# Patient Record
Sex: FEMALE | Race: BLACK OR AFRICAN AMERICAN | NOT HISPANIC OR LATINO | Employment: OTHER | URBAN - METROPOLITAN AREA
[De-identification: names, ages, dates, MRNs, and addresses within clinical notes are randomized per-mention and may not be internally consistent; named-entity substitution may affect disease eponyms.]

---

## 2017-05-03 ENCOUNTER — HOSPITAL ENCOUNTER (EMERGENCY)
Facility: OTHER | Age: 47
Discharge: HOME | End: 2017-05-03
Attending: EMERGENCY MEDICINE
Payer: MEDICAID

## 2017-05-03 VITALS
HEIGHT: 67 IN | HEART RATE: 88 BPM | BODY MASS INDEX: 30.92 KG/M2 | OXYGEN SATURATION: 99 % | TEMPERATURE: 98 F | DIASTOLIC BLOOD PRESSURE: 82 MMHG | RESPIRATION RATE: 16 BRPM | WEIGHT: 197 LBS | SYSTOLIC BLOOD PRESSURE: 171 MMHG
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
## 2017-05-03 DIAGNOSIS — S93.601A RIGHT FOOT SPRAIN, INITIAL ENCOUNTER: ICD-10-CM

## 2017-05-03 DIAGNOSIS — G89.29 CHRONIC RIGHT HIP PAIN: Primary | ICD-10-CM

## 2017-05-03 DIAGNOSIS — M25.551 CHRONIC RIGHT HIP PAIN: Primary | ICD-10-CM

## 2017-05-03 PROCEDURE — 25000003 PHARM REV CODE 250: Performed by: NURSE PRACTITIONER

## 2017-05-03 PROCEDURE — 99284 EMERGENCY DEPT VISIT MOD MDM: CPT

## 2017-05-03 RX ORDER — ACETAMINOPHEN 500 MG
1000 TABLET ORAL
Status: COMPLETED | OUTPATIENT
Start: 2017-05-03 | End: 2017-05-03

## 2017-05-03 RX ADMIN — ACETAMINOPHEN 1000 MG: 500 TABLET ORAL at 06:05

## 2017-05-03 NOTE — ED TRIAGE NOTES
Pt to ED with complaint of right hip pain.  [facility] and started having hip pain in December.  States she fell last night and having foot pain and lower leg pain.  Patient states she has been falling more feels like her hip just gives out on her.  scratches noted to top of foot.  Patient states she has not seen anyone about her hip pain states she was thinking may be arthirits.Patient is AAOX3 RR easy non labored no other complaints.

## 2017-05-03 NOTE — ED PROVIDER NOTES
"Encounter Date: 5/3/2017       History     Chief Complaint   Patient presents with    Hip Problem     Pt states yeaterday that her right hip gave out, and fell. now CO right lower leg pain.     Review of patient's allergies indicates:  No Known Allergies  HPI Comments: 46-year-old female with PMH of diabetes, CKD, and HTN presenting to the ED with complaints of right hip and right ankle pain status post fall.  Patient reports having chronic right hip for the last year that has been intermittent.  Patient reports falling approximately one year ago resulting in right hip pain.  Denies having chronic hip pain evaluated by PCP. Patient reports yesterday "I was going down stairs and my hip went out on me".  Reports falling and "I think I twisted my ankle".  Denies head trauma and LOC.  Denies low back pain.  Denies radiating sharp pain.  Denies paresthesia.  Denies fever, nausea, and vomiting.  Swelling and tenderness noted to right ankle.  Denies use of over-the-counter medications.    The history is provided by the patient.     Past Medical History:   Diagnosis Date    Diabetes mellitus     Hypertension      Past Surgical History:   Procedure Laterality Date     SECTION, LOW TRANSVERSE      EYE SURGERY       Family History   Problem Relation Age of Onset    Hyperlipidemia Mother     Diabetes Mother     Cancer Father      Social History   Substance Use Topics    Smoking status: Current Every Day Smoker     Packs/day: 1.00     Types: Cigarettes    Smokeless tobacco: None    Alcohol use No     Review of Systems  A complete review of systems was performed and was negative except as noted in the HPI.  Physical Exam   Initial Vitals   BP Pulse Resp Temp SpO2   17 1632 17 1632 17 1632 17 1632 17 1632   188/86 94 18 97.7 °F (36.5 °C) 99 %     Physical Exam    Nursing note and vitals reviewed.  Constitutional: She appears well-developed and well-nourished. No distress.   HENT: "   Head: Normocephalic and atraumatic.   Right Ear: External ear normal.   Left Ear: External ear normal.   Mouth/Throat: Oropharynx is clear and moist. No oropharyngeal exudate.   Eyes: EOM are normal. Pupils are equal, round, and reactive to light.   Neck: Normal range of motion. Neck supple.   Cardiovascular: Normal rate, regular rhythm and intact distal pulses.   Pulmonary/Chest: Breath sounds normal. No respiratory distress. She has no wheezes. She has no rhonchi.   Abdominal: Soft. Bowel sounds are normal. She exhibits no distension. There is no tenderness.   Musculoskeletal: Normal range of motion. She exhibits no edema or tenderness.        Lumbar back: She exhibits normal range of motion, no tenderness and no bony tenderness.   Tenderness palpated to right hip. ROM intact.  Neurovascular and sensory intact.  Patient weightbearing but with pain.  +2 pedal pulse palpated.  Tenderness and swelling noted to dorsal aspect of right foot.  Range of motion intact.  Neurovascular and sensory intact.  No erythema noted.  Ankle not hot to touch.   Lymphadenopathy:     She has no cervical adenopathy.   Neurological: She is alert and oriented to person, place, and time. She has normal strength.   Skin: Skin is warm and dry. No rash noted. No erythema.   Psychiatric: She has a normal mood and affect.         ED Course   Procedures  Labs Reviewed   POCT URINE PREGNANCY             Medical Decision Making:   Initial Assessment:   This was an emergent evaluation of a 86-year-old female that presents with chronic right hip pain and right ankle pain status post fall.  The patient appears to have an ankle sprain.  The patient's xrays show no signs of fracture, dislocation, or subluxation.  Left hip pain for approximately one year intermittently.  I do not suspect septic joint, or other acute abnormality.  Patient afebrile, nontoxic-appearing, and hemodynamically stable.  Patient weightbearing and ambulatory with minimal  difficulty.  The patient could have a ligamentous injury, but the ankle doesn't appear to be unstable.  The patient will be discharged home to follow up with their physician or the doctor provided.  I have recommended RICE therapy. They will be treated with supportive care.                   ED Course     Clinical Impression:   The primary encounter diagnosis was Chronic right hip pain. Diagnoses of Fall and Right foot sprain, initial encounter were also pertinent to this visit.          Wendy Domingo NP  05/03/17 1998

## 2017-05-03 NOTE — ED AVS SNAPSHOT
[facility]  [address]  [place] [address]               Barbara Najera   5/3/2017  4:48 PM   ED    Description:  Female : 1970   Department:  [facility]           Your Care was Coordinated By:     Provider Role From To    Renata Steel MD Attending Provider 17 0937 --    Wendy Domingo NP Nurse Practitioner 17 [id number] --      Reason for Visit     Hip Problem           Diagnoses this Visit        Comments    Chronic right hip pain    -  Primary     Fall         Right foot sprain, initial encounter           ED Disposition     None           To Do List           Follow-up Information     Follow up with [facility]. Schedule an appointment as soon as possible for a visit in 1 week.    Specialties:  Behavioral Health, Psychiatry    Why:  For possible MRI    Contact information:    [address] [address]  [phone]        [facility] On Call     [facility] Nurse Care Line -  Assistance  Unless otherwise directed by your provider, please contact [facility], our nurse care line that is available for  assistance.     Registered nurses in the [facility] provide: appointment scheduling, clinical advisement, health education, and other advisory services.  Call: [phone] (toll free)               Medications           Message regarding Medications     Verify the changes and/or additions to your medication regime listed below are the same as discussed with your clinician today.  If any of these changes or additions are incorrect, please notify your healthcare provider.        These medications were administered today        Dose Freq    acetaminophen tablet 1,000 mg 1,000 mg ED 1 Time    Sig: Take 2 tablets (1,000 mg total) by mouth ED 1 Time.    Class: Normal    Route: Oral           Verify that the below list of medications is an accurate representation of the medications you are  "currently taking.  If none reported, the list may be blank. If incorrect, please contact your healthcare provider. Carry this list with you in case of emergency.           Current Medications     acetaminophen tablet 1,000 mg Take 2 tablets (1,000 mg total) by mouth ED 1 Time.    amlodipine (NORVASC) 5 MG tablet Take 1 tablet (5 mg total) by mouth once daily.    blood sugar diagnostic Strp 1 strip by Misc.(Non-Drug; Combo Route) route before meals as needed.    calcitRIOL (ROCALTROL) 0.25 MCG Cap Take 1 capsule (0.25 mcg total) by mouth every other day.    ergocalciferol (ERGOCALCIFEROL) 50,000 unit Cap Take 1 capsule (50,000 Units total) by mouth every 7 days.    ferrous sulfate 325 (65 FE) MG EC tablet Take 1 tablet (325 mg total) by mouth 2 (two) times daily.    hydrochlorothiazide (HYDRODIURIL) 25 MG tablet Take 1 tablet (25 mg total) by mouth once daily.    insulin aspart protamine-insulin aspart (NOVOLOG 70/30) 100 unit/mL (70-30) InPn pen 15 units SC each morning and 10 units SC each evening    lancets (ONETOUCH ULTRASOFT LANCETS) Misc 1 lancet by Misc.(Non-Drug; Combo Route) route 4 (four) times daily.    pen needle, diabetic (BD ULTRA-FINE DANII PEN NEEDLES) 32 gauge x 5/32" Ndle Inject 1 application into the skin 4 (four) times daily.    sodium bicarbonate 650 MG tablet Take 1 tablet (650 mg total) by mouth 2 (two) times daily.           Clinical Reference Information           Your Vitals Were     BP Pulse Temp Resp Height Weight    188/86 (BP Location: Left arm, Patient Position: Sitting) 94 97.7 °F (36.5 °C) (Oral) 18 5' 7" (1.702 m) 89.4 kg (197 lb)    SpO2 BMI             99% 30.85 kg/m2         Allergies as of 5/3/2017     No Known Allergies      Immunizations Administered on Date of Encounter - 5/3/2017     None      ED Micro, Lab, POCT     Start Ordered       Status Ordering Provider    05/03/17 1712 05/03/17 1712    Once,   Status:  Canceled      Canceled       ED Imaging Orders     Start Ordered "       Status Ordering Provider    05/03/17 1700 05/03/17 1700  X-Ray Hip 2 View Right  1 time imaging      Final result     05/03/17 1700 05/03/17 1700  X-Ray Ankle Complete Right  1 time imaging      Final result     05/03/17 1700 05/03/17 1700  X-Ray Foot Complete Right  1 time imaging      Edited Result - FINAL       Discharge References/Attachments     STRAINS AND SPRAINS, TREATING (ENGLISH)      [facility] Sign-Up     Activating your [facility] account is as easy as 1-2-3!     1) Visit [email or web address], select Sign Up Now, enter this activation code and your date of birth, then select Next.  [email or web address]  Expires: 6/17/2017  5:45 PM      2) Create a username and password to use when you visit [facility] in the future and select a security question in case you lose your password and select Next.    3) Enter your e-mail address and click Sign Up!    Additional Information  If you have questions, please e-mail [email or web address] or call [phone] to talk to our [facility] staff. Remember, [facility] is NOT to be used for urgent needs. For medical emergencies, dial 911.         Smoking Cessation     If you would like to quit smoking:   You may be eligible for free services if you are a [place] resident and started smoking cigarettes before September 1, 1988.  Call the Smoking Cessation Trust ([facility]) toll free at [phone] or [phone].   Call 9-315-QUIT-NOW if you do not meet the above criteria.   Contact us via email: [email or web address]   View our website for more information: [email or web address]         [facility] complies with applicable Federal civil rights laws and does not discriminate on the basis of race, color, national origin, age, disability, or sex.        Language Assistance Services     ATTENTION: Language assistance services are available, free of charge. Please call [phone].      ATENCIÓN: Si habla español, tiene a nunez disposición servicios  gratuitos de asistencia lingüística. Idalia tirado [phone].     BROOKS Ý: N?u b?n nói Ti?ng Vi?t, có các d?ch v? h? tr? ngôn ng? mi?n phí mahadh cho b?n. G?i s? [phone].

## 2017-05-31 ENCOUNTER — HOSPITAL ENCOUNTER (EMERGENCY)
Facility: HOSPITAL | Age: 47
Discharge: HOME | End: 2017-06-01
Attending: EMERGENCY MEDICINE
Payer: MEDICAID

## 2017-05-31 DIAGNOSIS — S02.5XXA CLOSED FRACTURE OF TOOTH, INITIAL ENCOUNTER: Primary | ICD-10-CM
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
## 2017-05-31 PROCEDURE — 99284 EMERGENCY DEPT VISIT MOD MDM: CPT

## 2017-05-31 RX ORDER — HYDROCODONE BITARTRATE AND ACETAMINOPHEN 5; 325 MG/1; MG/1
1 TABLET ORAL
Status: COMPLETED | OUTPATIENT
Start: 2017-06-01 | End: 2017-06-01

## 2017-06-01 VITALS
TEMPERATURE: 99 F | BODY MASS INDEX: 28.41 KG/M2 | RESPIRATION RATE: 20 BRPM | SYSTOLIC BLOOD PRESSURE: 190 MMHG | WEIGHT: 181 LBS | HEIGHT: 67 IN | DIASTOLIC BLOOD PRESSURE: 86 MMHG | OXYGEN SATURATION: 100 % | HEART RATE: 94 BPM

## 2017-06-01 PROCEDURE — 90471 IMMUNIZATION ADMIN: CPT | Performed by: EMERGENCY MEDICINE

## 2017-06-01 PROCEDURE — 90715 TDAP VACCINE 7 YRS/> IM: CPT | Performed by: EMERGENCY MEDICINE

## 2017-06-01 PROCEDURE — 63600175 PHARM REV CODE 636 W HCPCS: Performed by: EMERGENCY MEDICINE

## 2017-06-01 PROCEDURE — 25000003 PHARM REV CODE 250: Performed by: EMERGENCY MEDICINE

## 2017-06-01 RX ORDER — AMOXICILLIN AND CLAVULANATE POTASSIUM 875; 125 MG/1; MG/1
1 TABLET, FILM COATED ORAL 2 TIMES DAILY
Qty: 14 TABLET | Refills: 0 | Status: SHIPPED | OUTPATIENT
Start: 2017-06-01 | End: 2017-10-25

## 2017-06-01 RX ORDER — HYDROCODONE BITARTRATE AND ACETAMINOPHEN 5; 325 MG/1; MG/1
1 TABLET ORAL EVERY 12 HOURS PRN
Qty: 12 TABLET | Refills: 0 | Status: SHIPPED | OUTPATIENT
Start: 2017-06-01

## 2017-06-01 RX ADMIN — CLOSTRIDIUM TETANI TOXOID ANTIGEN (FORMALDEHYDE INACTIVATED), CORYNEBACTERIUM DIPHTHERIAE TOXOID ANTIGEN (FORMALDEHYDE INACTIVATED), BORDETELLA PERTUSSIS TOXOID ANTIGEN (GLUTARALDEHYDE INACTIVATED), BORDETELLA PERTUSSIS FILAMENTOUS HEMAGGLUTININ ANTIGEN (FORMALDEHYDE INACTIVATED), BORDETELLA PERTUSSIS PERTACTIN ANTIGEN, AND BORDETELLA PERTUSSIS FIMBRIAE 2/3 ANTIGEN 0.5 ML: 5; 2; 2.5; 5; 3; 5 INJECTION, SUSPENSION INTRAMUSCULAR at 12:06

## 2017-06-01 RX ADMIN — HYDROCODONE BITARTRATE AND ACETAMINOPHEN 1 TABLET: 5; 325 TABLET ORAL at 12:06

## 2017-06-01 NOTE — ED NOTES
Pt reports she slipped and fell when her hip left hip gave out while walking to come inside the hospital to visit her daughter. Pt reports ain to carlos knees ,hips and right side of face. Right front tooth broken at gum line

## 2017-06-01 NOTE — ED NOTES
Pt updated on status. Care plan discussed with pt. Pt verbalized understanding of information given.  NAD.  Will continue to monitor.

## 2017-06-01 NOTE — ED NOTES
Patient identifiers verified and correct for Barbara Najera.    LOC: The patient is awake, alert and aware of environment with an appropriate affect, the patient is oriented x 3 and speaking appropriately.  MUSCULOSKELETAL: Patient moving all extremities spontaneously, no obvious swelling or deformities noted.  RESPIRATORY: Airway is open and patent, respirations are spontaneous, patient has a normal effort and rate, no accessory muscle use noted, bilateral breath sounds CTA.  CARDIAC: Patient has a normal rate and regular rhythm, no periphreal edema noted, capillary refill < 3 seconds.  ABDOMEN: Soft and non tender to palpation, no distention noted, normoactive bowel sounds present in all four quadrants.  NEUROLOGIC: PERRL, 4 mm bilaterally, eyes open spontaneously, behavior appropriate to situation, follows commands, facial expression symmetrical, bilateral hand grasp equal and even, purposeful motor response noted, normal sensation in all extremities when touched with a finger.

## 2017-06-14 NOTE — ED PROVIDER NOTES
Encounter Date: 2017       History     Chief Complaint   Patient presents with    Fall     pt. was walking up sidewalk to visit daughter in ed when her rt. hip became weak and gave out on her causing her to fell on cement. no loc. pt. has a broken tooth, facial abrasions (R), abrasion to rt. knee pt. has weakness in her rt. hip from a previous fall a couple of months ago     Review of patient's allergies indicates:  No Known Allergies  Patient came to visit her daughter at the hospital and fell on the sidewalk on the side of the hospital.  She denies slipping or tripping, reports that she has intermittent R leg weakness and her leg gave out causing her to fall onto her face.  Patient denies LOC.  Reports it was difficult to bear weight following the incident on her R leg.  She has been seen by PCP for leg weakness but has not had advanced imaging such as MRI.        The history is provided by the patient.   Fall   The accident occurred just prior to arrival. The fall occurred while walking. Distance fallen: From standing.  She landed on concrete. The volume of blood lost was minimal. The point of impact was the face and right hip. The pain is present in the face and right hip. The pain is at a severity of 7/10. She was ambulatory at the scene. There was no entrapment after the fall. There was no drug use involved in the accident. There was no alcohol use involved in the accident. Pertinent negatives include no back pain, no fever and no nausea.     Past Medical History:   Diagnosis Date    Diabetes mellitus     Hypertension      Past Surgical History:   Procedure Laterality Date     SECTION, LOW TRANSVERSE      EYE SURGERY       Family History   Problem Relation Age of Onset    Hyperlipidemia Mother     Diabetes Mother     Cancer Father      Social History   Substance Use Topics    Smoking status: Current Every Day Smoker     Packs/day: 1.00     Types: Cigarettes    Smokeless tobacco: Not on  file    Alcohol use No     Review of Systems   Constitutional: Negative for fever.   HENT: Positive for dental problem and facial swelling. Negative for sore throat.    Respiratory: Negative for shortness of breath.    Cardiovascular: Negative for chest pain.   Gastrointestinal: Negative for nausea.   Genitourinary: Negative for dysuria.   Musculoskeletal: Negative for back pain.   Skin: Positive for wound. Negative for rash.   Neurological: Positive for weakness.   Hematological: Does not bruise/bleed easily.   All other systems reviewed and are negative.      Physical Exam     Initial Vitals [05/31/17 2325]   BP Pulse Resp Temp SpO2   (!) 156/110 106 20 98.7 °F (37.1 °C) 97 %     Physical Exam    Nursing note and vitals reviewed.  Constitutional: She appears well-developed and well-nourished.   HENT:   Head: Normocephalic.   Facial trauma with fratured front teeth, gingival disease and extensive dental decay noted. R facial abrasions and contusion with small superficial laceration noted   Eyes: Conjunctivae and EOM are normal. Pupils are equal, round, and reactive to light. Right eye exhibits no discharge. Left eye exhibits no discharge. No scleral icterus.   Neck: Normal range of motion. Neck supple.   Cardiovascular: Normal rate and normal heart sounds. Exam reveals no gallop and no friction rub.    No murmur heard.  Tachycardic 100-110   Pulmonary/Chest: Breath sounds normal. No stridor. No respiratory distress. She has no wheezes. She has no rhonchi. She has no rales.   Abdominal: Soft. Bowel sounds are normal. She exhibits no distension and no mass. There is no tenderness. There is no rebound and no guarding.   Musculoskeletal: Normal range of motion.   Neurological: She is alert and oriented to person, place, and time. She has normal strength. No cranial nerve deficit or sensory deficit.   Skin: Skin is warm and dry. Capillary refill takes less than 2 seconds.   Psychiatric: She has a normal mood and  affect. Her behavior is normal. Judgment and thought content normal.         ED Course   Procedures  Labs Reviewed - No data to display     Imaging Results          X-Ray Hips Bilateral 2 View Incl AP Pelvis (Final result)  Result time 06/01/17 02:42:10    Final result by Carson Perera MD (06/01/17 02:42:10)                 Impression:        No acute displaced fracture or dislocation identified.      Electronically signed by: CARSON PERERA MD, MD  Date:     06/01/17  Time:    02:42              Narrative:    COMPARISON: Right hip series 5/3/17    FINDINGS: 5 views bilateral hip series.        Bones are well mineralized. Overall alignment is within normal limits.  No acute displaced fracture, dislocation, or destructive osseous process identified.  Mild degenerative change of the pubic symphysis and bilateral SI and femoral acetabular joints.  Pelvic phleboliths and vascular calcifications noted. No subcutaneous emphysema or radiodense retained foreign body.                             CT Maxillofacial Without Contrast (Final result)  Result time 06/01/17 01:57:54    Final result by Carson Perera MD (06/01/17 01:57:54)                 Impression:        1. Upper and lower lip soft tissue swelling/contusion, without maxillofacial acute displaced fracture or dislocation identified.    2. Multifocal dental caries and periodontal disease.      Electronically signed by: CARSON PERERA MD, MD  Date:     06/01/17  Time:    01:57              Narrative:    HISTORY: Trauma    COMPARISON: Maxillofacial CT 7/28/10    FINDINGS: There is prominence of the upper and also lower lips, right more so than the left, suggesting soft tissue swelling from contusion in the setting of trauma.    The orbits and orbital contents appear intact.  The globes are symmetric and the lenses are anteriorly located.    Bilateral orbital rims, zygomatic arches, pterygoid plates, and mandibular condyles, TMJs and nasal bones appear symmetric and  intact. Minimal degenerative change of the included upper cervical spine. Bony nasal septum is relatively midline and intact.    There are multiple dental caries with a few missing teeth, as well as periapical lucency associated with right mandibular posterior most pre-molar, anterior most molar and left mandibular anterior most pre-molar teeth. No subcutaneous emphysema or radiodense retained foreign body.    The paranasal sinuses and imaged portions of the bilateral mastoid air cells are clear. Included airway is midline and patent. Bilateral submandibular glands and imaged portions of the bilateral parotid glands are within normal limits.                                 Medical Decision Making:   Initial Assessment:   Patient with leg weakness ongoing and fall.  Dental and facial trauma noted.  Discussed need for further eval of back and weakness, r/o head or facial injury with CT.  Dental fracture with avulsed partial avulsion of teeth noted and patient has a dentist she can followup with.                    ED Course     Clinical Impression:   The primary encounter diagnosis was Closed fracture of tooth, initial encounter. A diagnosis of Fall, accidental was also pertinent to this visit.    Disposition:   Disposition: Discharged  Condition: Stable       Victorina Mendoza MD  06/18/17 9582

## 2017-08-21 ENCOUNTER — TELEPHONE (OUTPATIENT)
Dept: PODIATRY | Facility: CLINIC | Age: 47
End: 2017-08-21

## 2017-10-06 ENCOUNTER — HOSPITAL ENCOUNTER (EMERGENCY)
Facility: OTHER | Age: 47
Discharge: HOME | End: 2017-10-07
Attending: EMERGENCY MEDICINE
Payer: MEDICAID

## 2017-10-06 DIAGNOSIS — K59.00 CONSTIPATION: Primary | ICD-10-CM

## 2017-10-06 LAB
B-HCG UR QL: NEGATIVE
CTP QC/QA: YES

## 2017-10-06 PROCEDURE — 96360 HYDRATION IV INFUSION INIT: CPT

## 2017-10-06 PROCEDURE — 99284 EMERGENCY DEPT VISIT MOD MDM: CPT | Mod: 25

## 2017-10-06 PROCEDURE — 25000003 PHARM REV CODE 250: Performed by: PHYSICIAN ASSISTANT

## 2017-10-06 PROCEDURE — 96361 HYDRATE IV INFUSION ADD-ON: CPT

## 2017-10-06 PROCEDURE — 81025 URINE PREGNANCY TEST: CPT | Performed by: PHYSICIAN ASSISTANT

## 2017-10-06 RX ORDER — PSEUDOEPHEDRINE/ACETAMINOPHEN 30MG-500MG
100 TABLET ORAL
Status: COMPLETED | OUTPATIENT
Start: 2017-10-06 | End: 2017-10-06

## 2017-10-06 RX ORDER — DOCUSATE SODIUM 100 MG/1
100 CAPSULE, LIQUID FILLED ORAL 2 TIMES DAILY
Qty: 60 CAPSULE | Refills: 0 | Status: SHIPPED | OUTPATIENT
Start: 2017-10-06

## 2017-10-06 RX ORDER — SYRING-NEEDL,DISP,INSUL,0.3 ML 29 G X1/2"
300 SYRINGE, EMPTY DISPOSABLE MISCELLANEOUS
Status: COMPLETED | OUTPATIENT
Start: 2017-10-06 | End: 2017-10-06

## 2017-10-06 RX ADMIN — Medication 100 ML: at 08:10

## 2017-10-06 RX ADMIN — SODIUM CHLORIDE 500 ML: 0.9 INJECTION, SOLUTION INTRAVENOUS at 10:10

## 2017-10-06 RX ADMIN — MAGESIUM CITRATE 300 ML: 1.75 LIQUID ORAL at 10:10

## 2017-10-07 VITALS
RESPIRATION RATE: 16 BRPM | TEMPERATURE: 98 F | HEART RATE: 92 BPM | OXYGEN SATURATION: 98 % | HEIGHT: 66 IN | BODY MASS INDEX: 26.52 KG/M2 | SYSTOLIC BLOOD PRESSURE: 148 MMHG | WEIGHT: 165 LBS | DIASTOLIC BLOOD PRESSURE: 75 MMHG

## 2017-10-07 NOTE — ED TRIAGE NOTES
Pt presents to the ED with c/o rectal pain starting last night. Pt states she was unable to pass stool for 4 days so drank some mag citrate with no relief. Pt states that when she gets the urge to push she feels like something is stopping it. Denies the presence of abdominal pain or N/V. Denies any other symptoms.

## 2017-10-07 NOTE — ED PROVIDER NOTES
Encounter Date: 10/6/2017       History     Chief Complaint   Patient presents with    Rectal Pain     pt reports has been having rectal pain 8/10 reports had been constipated and took some mag citrate last night with only little BM     Constipation     Patient is a 46-year-old female with a past medical history of hypertension and diabetes, presenting to the emergency department with complaints of constipation.  The patient reports that for the past 2 weeks she's had problems with intermittent constipation.  She reports that she's been trying over the counter medication including mag citrate and suppositories. She reports she was only able to pass a small amount of stool. She states she is concerned because when she tries to go, she has pain in her rectum. She denies previous episode.       The history is provided by the patient.     Review of patient's allergies indicates:  No Known Allergies  Past Medical History:   Diagnosis Date    Diabetes mellitus     Hypertension      Past Surgical History:   Procedure Laterality Date     SECTION, LOW TRANSVERSE      EYE SURGERY       Family History   Problem Relation Age of Onset    Hyperlipidemia Mother     Diabetes Mother     Cancer Father      Social History   Substance Use Topics    Smoking status: Current Every Day Smoker     Packs/day: 1.00     Types: Cigarettes    Smokeless tobacco: Never Used    Alcohol use No     Review of Systems   Constitutional: Negative for activity change, appetite change, chills, fatigue and fever.   HENT: Negative for congestion, rhinorrhea and sore throat.    Eyes: Negative for photophobia and visual disturbance.   Respiratory: Negative for cough, shortness of breath and wheezing.    Cardiovascular: Negative for chest pain.   Gastrointestinal: Positive for constipation and rectal pain. Negative for abdominal pain, diarrhea, nausea and vomiting.   Genitourinary: Negative for dysuria, hematuria and urgency.    Musculoskeletal: Negative for back pain, myalgias and neck pain.   Skin: Negative for color change and wound.   Neurological: Negative for weakness and headaches.   Psychiatric/Behavioral: Negative for agitation and confusion.       Physical Exam     Initial Vitals [10/06/17 1950]   BP Pulse Resp Temp SpO2   (!) 160/83 101 16 97.7 °F (36.5 °C) 98 %      MAP       108.67         Physical Exam    Nursing note and vitals reviewed.  Constitutional: She appears well-developed and well-nourished. She is not diaphoretic. She is cooperative.  Non-toxic appearance. She does not have a sickly appearance. She does not appear ill. No distress.   Well appearing,  female accompanied by her daughter in the ED. She is speaking in clear and full sentences, moving all extremities. She is in no acute distress.    HENT:   Head: Normocephalic and atraumatic.   Right Ear: External ear normal.   Left Ear: External ear normal.   Nose: Nose normal.   Mouth/Throat: Oropharynx is clear and moist.   Eyes: Conjunctivae and EOM are normal.   Neck: Normal range of motion. Neck supple.   Cardiovascular: Normal rate, regular rhythm and normal heart sounds.   Pulmonary/Chest: Breath sounds normal. No respiratory distress. She has no wheezes.   Abdominal: Soft. Bowel sounds are normal. She exhibits no distension. There is no tenderness. There is no rebound and no guarding.   Genitourinary:   Genitourinary Comments: Significant, cari colored stool surrounding the anus and bilateral buttocks.    Musculoskeletal: Normal range of motion.   Neurological: She is alert and oriented to person, place, and time. GCS eye subscore is 4. GCS verbal subscore is 5. GCS motor subscore is 6.   Skin: Skin is warm.   Psychiatric: She has a normal mood and affect. Her behavior is normal. Judgment and thought content normal.         ED Course   Procedures  Labs Reviewed   POCT URINE PREGNANCY        Imaging Results          X-Ray Abdomen Flat And Erect  (Final result)  Result time 10/06/17 23:14:30    Final result by Rigo Lawler MD (10/06/17 23:14:30)                 Impression:       Nonobstructive bowel gas pattern.    Large amount of stool within the sigmoid colon.          Electronically signed by: RIGO LAWLER MD  Date:     10/06/17  Time:    23:14              Narrative:    Exam: [id number]  10/06/17  22:22:44 [id number] ([facility]) : XR ABDOMEN FLAT AND ERECT    Technique:    Frontal and upright radiographs of the abdomen.    Comparison:    11/26/2016    Findings:      The lung bases are within normal limits.  There is no evidence of free air beneath the hemidiaphragms.    No differential air-fluid levels are noted.  There is no evidence of bowel distention.  There is large amount of stool within the sigmoid colon. There is no evidence of pneumatosis.  No portal venous air is identified.  No abnormal calcifications are present.    The osseous structures are unremarkable.                             X-Rays:   Independently Interpreted Readings:   Other Readings:  X-ray flat and erect - large stool in the sigmoid colon, no free air    Medical Decision Making:   Initial Assessment:   Urgent evaluation of a 46 y.o. female with a past medical history of HTN, DM, presenting to the emergency department complaining of constipation and rectal pain. Patient is afebrile, nontoxic appearing and hemodynamically stable. Physical exam reveals regular rate and rhythm, lungs are clear to auscultation bilaterally, abdomen is soft and nontender. Active stool at the anus.     Independently Interpreted Test(s):   I have ordered and independently interpreted X-rays - see prior notes.  Clinical Tests:   Lab Tests: Ordered and Reviewed  The following lab test(s) were unremarkable: UPT  Radiological Study: Ordered and Reviewed  ED Management:  Will plan for brown bomb and reassess. X-ray shows large amount of stool within the sigmoid colon.   Patient was given brown bomb enema after which she  had a large bowel movement. She reports much relief. At this time, no further testing or imaging is warranted. Patient is given a prescription for colace and counseled on symptomatic care and treatment. Stable for discharge home. The patient was instructed to follow up with a primary care provider in 2 days or to return to the emergency department for worsening symptoms. The treatment plan was discussed with the patient who demonstrated understanding and comfort with plan. The patient's history, physical exam, and plan of care was discussed with and agreed upon with my supervising physician.    Other:   I have discussed this case with another health care provider.       <> Summary of the Discussion: Dr. Motley  This note was created using Dragon Medical Dictation. There may be typographical errors secondary to dictation.                    ED Course      Clinical Impression:     1. Constipation         Disposition:   Disposition: Discharged  Condition: Stable                        Ani Barahona PA-C  10/06/17 [id number]

## 2017-10-23 ENCOUNTER — HOSPITAL ENCOUNTER (EMERGENCY)
Facility: OTHER | Age: 47
Discharge: HOME | End: 2017-10-23
Attending: EMERGENCY MEDICINE
Payer: MEDICAID

## 2017-10-23 VITALS
SYSTOLIC BLOOD PRESSURE: 219 MMHG | HEIGHT: 67 IN | OXYGEN SATURATION: 100 % | TEMPERATURE: 98 F | BODY MASS INDEX: 25.11 KG/M2 | WEIGHT: 160 LBS | DIASTOLIC BLOOD PRESSURE: 103 MMHG | HEART RATE: 96 BPM | RESPIRATION RATE: 16 BRPM

## 2017-10-23 DIAGNOSIS — M51.36 DEGENERATIVE DISC DISEASE, LUMBAR: ICD-10-CM

## 2017-10-23 DIAGNOSIS — R29.898 UPPER EXTREMITY WEAKNESS: ICD-10-CM

## 2017-10-23 DIAGNOSIS — M54.16 LUMBAR RADICULOPATHY: ICD-10-CM

## 2017-10-23 DIAGNOSIS — D64.9 ANEMIA, UNSPECIFIED TYPE: ICD-10-CM

## 2017-10-23 DIAGNOSIS — N18.9 CHRONIC RENAL INSUFFICIENCY, UNSPECIFIED STAGE: ICD-10-CM

## 2017-10-23 DIAGNOSIS — R20.2 PARESTHESIA OF BOTH LEGS: ICD-10-CM

## 2017-10-23 DIAGNOSIS — R29.898 WEAKNESS OF BOTH LOWER EXTREMITIES: Primary | ICD-10-CM

## 2017-10-23 LAB
ALBUMIN SERPL BCP-MCNC: 3.1 G/DL
ALP SERPL-CCNC: 82 U/L
ALT SERPL W/O P-5'-P-CCNC: 13 U/L
ANION GAP SERPL CALC-SCNC: 7 MMOL/L
AST SERPL-CCNC: 12 U/L
B-HCG UR QL: NEGATIVE
BASOPHILS # BLD AUTO: 0.04 K/UL
BASOPHILS NFR BLD: 0.8 %
BILIRUB SERPL-MCNC: 0.2 MG/DL
BUN SERPL-MCNC: 44 MG/DL
CALCIUM SERPL-MCNC: 8.9 MG/DL
CHLORIDE SERPL-SCNC: 117 MMOL/L
CO2 SERPL-SCNC: 17 MMOL/L
CREAT SERPL-MCNC: 1.8 MG/DL
CTP QC/QA: YES
DIFFERENTIAL METHOD: ABNORMAL
EOSINOPHIL # BLD AUTO: 0.2 K/UL
EOSINOPHIL NFR BLD: 4.4 %
ERYTHROCYTE [DISTWIDTH] IN BLOOD BY AUTOMATED COUNT: 13.1 %
EST. GFR  (AFRICAN AMERICAN): 39 ML/MIN/1.73 M^2
EST. GFR  (NON AFRICAN AMERICAN): 34 ML/MIN/1.73 M^2
GLUCOSE SERPL-MCNC: 101 MG/DL
HCT VFR BLD AUTO: 32.7 %
HGB BLD-MCNC: 10.7 G/DL
LYMPHOCYTES # BLD AUTO: 1.3 K/UL
LYMPHOCYTES NFR BLD: 25.1 %
MCH RBC QN AUTO: 28.2 PG
MCHC RBC AUTO-ENTMCNC: 32.7 G/DL
MCV RBC AUTO: 86 FL
MONOCYTES # BLD AUTO: 0.5 K/UL
MONOCYTES NFR BLD: 9.7 %
NEUTROPHILS # BLD AUTO: 3 K/UL
NEUTROPHILS NFR BLD: 59.8 %
PLATELET # BLD AUTO: 333 K/UL
PMV BLD AUTO: 10.8 FL
POCT GLUCOSE: 76 MG/DL (ref 70–110)
POTASSIUM SERPL-SCNC: 3.8 MMOL/L
PROT SERPL-MCNC: 7 G/DL
RBC # BLD AUTO: 3.8 M/UL
SODIUM SERPL-SCNC: 141 MMOL/L
WBC # BLD AUTO: 5.05 K/UL

## 2017-10-23 PROCEDURE — 99284 EMERGENCY DEPT VISIT MOD MDM: CPT | Mod: 25

## 2017-10-23 PROCEDURE — 82962 GLUCOSE BLOOD TEST: CPT

## 2017-10-23 PROCEDURE — 85025 COMPLETE CBC W/AUTO DIFF WBC: CPT

## 2017-10-23 PROCEDURE — 80053 COMPREHEN METABOLIC PANEL: CPT

## 2017-10-23 PROCEDURE — 25500020 PHARM REV CODE 255: Performed by: EMERGENCY MEDICINE

## 2017-10-23 PROCEDURE — 81025 URINE PREGNANCY TEST: CPT | Performed by: EMERGENCY MEDICINE

## 2017-10-23 PROCEDURE — 96374 THER/PROPH/DIAG INJ IV PUSH: CPT

## 2017-10-23 PROCEDURE — A9585 GADOBUTROL INJECTION: HCPCS | Performed by: EMERGENCY MEDICINE

## 2017-10-23 PROCEDURE — 25000003 PHARM REV CODE 250: Performed by: EMERGENCY MEDICINE

## 2017-10-23 RX ORDER — GADOBUTROL 604.72 MG/ML
7 INJECTION INTRAVENOUS
Status: COMPLETED | OUTPATIENT
Start: 2017-10-23 | End: 2017-10-23

## 2017-10-23 RX ORDER — AMLODIPINE BESYLATE 5 MG/1
5 TABLET ORAL
Status: COMPLETED | OUTPATIENT
Start: 2017-10-23 | End: 2017-10-23

## 2017-10-23 RX ORDER — CHLORTHALIDONE 25 MG/1
25 TABLET ORAL DAILY
COMMUNITY

## 2017-10-23 RX ADMIN — AMLODIPINE BESYLATE 5 MG: 5 TABLET ORAL at 09:10

## 2017-10-23 RX ADMIN — GADOBUTROL 7 ML: 604.72 INJECTION INTRAVENOUS at 08:10

## 2017-10-23 NOTE — ED TRIAGE NOTES
"Pt reports to ED c/o numbness/tingling with inability to ambulate due to bilateral lower leg weakness x 5 days. Pt reports mobility problem worsening since March of this year, admits having a fall in 2016, denies any recent fall or trauma. Pt normally able to ambulate at home with assistance from walker but since Thursday pt has been lifting self to "bucket next to bed to" urinate. Pt appears disheveled and unclean. PMH of HTN, DM, neuropathy. AAO x  4. Bilateral  strength remains intact.  "

## 2017-10-23 NOTE — ED PROVIDER NOTES
"Encounter Date: 10/23/2017    SCRIBE #1 NOTE: I, Danyellealyssa Stevenson, am scribing for, and in the presence of, Dr. Motley.       History     Chief Complaint   Patient presents with    Numbness     To both feet since last Thursday, pt reports able to walk with cane but feels "numbness to both feet, right worse than left "    VSS     Time seen by provider: 5:08 PM    This is a 46 y.o. female who presents to the ED via EMS with complaint of numbness to the right foot that has persisted for the past 4 days.  The patient reports that she is unable to balance herself and "get the " to stand up.  She endorses associated bilateral lower extremity weakness and disequilibrium.  She mentions no fever, chills, congestion, rhinorrhea, sore throat, cough, visual disturbances, SOB, CP, and speech difficulty.  The patient reports no identifying, alleviating, or exacerbating factors.  Although she has history of problems with her bilateral lower extremities, she states that she is now unable to "stand on the foot to get balanced."  She denies any recent trauma, including falls or heavy lifting.  She mentions that she can walk backwards using her walker without difficulty.  Since an initial "slip and fall" in [employer] approximately 1 year ago, the patient reports that she has had multiple falls, the most recent of which was approximately 1 month ago.  She claims that she has been deteriorating over the past 7 months.  She reports history of NIDDM, diabetic neuropathy, osteomyelitis, and HTN.  The patient states that her baseline CBG is approximately 150 mg/dL.  She reports family history of RA.  She reports surgical history of toe amputation, eye surgery, and  section.  She claims that she has told her PCP about this complaint, and she has only been referred to a podiatrist, with whom she has an appointment in 2 days.          The history is provided by the patient.     Review of patient's allergies indicates:  No Known " Allergies  Past Medical History:   Diagnosis Date    Diabetes mellitus     Hypertension      Past Surgical History:   Procedure Laterality Date     SECTION, LOW TRANSVERSE      EYE SURGERY       Family History   Problem Relation Age of Onset    Hyperlipidemia Mother     Diabetes Mother     Cancer Father      Social History   Substance Use Topics    Smoking status: Current Every Day Smoker     Packs/day: 1.00     Types: Cigarettes    Smokeless tobacco: Never Used    Alcohol use No     Review of Systems   Constitutional: Negative for chills and fever.   HENT: Negative for congestion and sore throat.    Eyes: Negative for photophobia, redness and visual disturbance.   Respiratory: Negative for cough and shortness of breath.    Cardiovascular: Negative for chest pain.   Gastrointestinal: Negative for abdominal pain, nausea and vomiting.   Genitourinary: Negative for dysuria.   Musculoskeletal: Negative for back pain.   Skin: Negative for rash.   Neurological: Positive for weakness (bilateral lower extremities) and numbness (bilateral lower extremities). Negative for speech difficulty, light-headedness and headaches.   Psychiatric/Behavioral: Negative for confusion.       Physical Exam     Initial Vitals [10/23/17 1617]   BP Pulse Resp Temp SpO2   (!) 198/97 97 18 98 °F (36.7 °C) 98 %      MAP       130.67         Physical Exam    Nursing note and vitals reviewed.  Constitutional: She appears well-developed and well-nourished. She is not diaphoretic. No distress.   HENT:   Head: Normocephalic and atraumatic.   Mouth/Throat: Oropharynx is clear and moist.   Oropharynx is clear and intact.  Moist mucous membranes.    Eyes: Conjunctivae and EOM are normal. Pupils are equal, round, and reactive to light. No scleral icterus.   Conjunctiva are pink, clear, and intact.    Neck: Normal range of motion. Neck supple.   Cardiovascular: Normal rate, regular rhythm, S1 normal, S2 normal and normal heart sounds. Exam  reveals no gallop and no friction rub.    No murmur heard.  Pulses:       Dorsalis pedis pulses are 2+ on the right side, and 2+ on the left side.        Posterior tibial pulses are 2+ on the right side, and 2+ on the left side.   Pulmonary/Chest: Breath sounds normal. No respiratory distress. She has no wheezes. She has no rhonchi. She has no rales.   Clear to ascultation bilaterally.   Abdominal: Soft. Bowel sounds are normal. There is no tenderness. There is no rebound and no guarding.   No audible bruits.   Musculoskeletal: Normal range of motion. She exhibits no edema or tenderness.   No lower extremity edema.    Lymphadenopathy:     She has no cervical adenopathy.   Neurological: She is alert and oriented to person, place, and time.   1/5 strength with flexion of bilateral lower extremities. 5/5 strength with extension of bilateral lower extremities.  4/5 strength to bilateral upper extremities.  Sensation intact to light touch throughout.     Skin: Skin is warm and dry. No rash noted. No pallor.   Warm and dry. No skin tenting, rashes, or lesions.     Psychiatric: She has a normal mood and affect. Her behavior is normal. Judgment and thought content normal.         ED Course   Procedures  Labs Reviewed   CBC W/ AUTO DIFFERENTIAL - Abnormal; Notable for the following:        Result Value    RBC 3.80 (*)     Hemoglobin 10.7 (*)     Hematocrit 32.7 (*)     All other components within normal limits   COMPREHENSIVE METABOLIC PANEL - Abnormal; Notable for the following:     Chloride 117 (*)     CO2 17 (*)     BUN, Bld 44 (*)     Creatinine 1.8 (*)     Albumin 3.1 (*)     Anion Gap 7 (*)     eGFR if  39 (*)     eGFR if non  34 (*)     All other components within normal limits   POCT URINE PREGNANCY   POCT GLUCOSE   POCT GLUCOSE MONITORING CONTINUOUS      Imaging Results          MRI Lumbar Spine W WO Cont (Final result)  Result time 10/23/17 20:47:41    Final result by Rigo Lawler MD  (10/23/17 20:47:41)                 Impression:       -Disc desiccation at L5-S1 level with posterior annular fissure associated central disc protrusion.  No evidence of significant spinal canal or neural foraminal narrowing.    -No evidence of abnormal enhancement in the lumbar spine.              Electronically signed by: TEENA CEE MD  Date:     10/23/17  Time:    20:47              Narrative:    Exam: [id number]  10/23/17  18:44:30 [id number] ([facility]) : MRI LUMBAR SPINE W WO CONTRAST    Technique:    Multiplanar and multisequence MRI of the lumbar spine was performed without and with intravenous contrast.  The patient received 7 cc of gadavist IV.    Comparison:     None     Findings:      The lumbar alignment is within normal limits.  The bone marrow signal is unremarkable.  The conus terminates at level of L1.  The cauda equina nerve roots are unremarkable.  There are no intraspinal collections.  There is hypertrophy of the posterior elements.    There is disc desiccation at L3-L4, L4-L5, and L5-S1 levels.  Evaluation of individual disc levels reveals the following:    L1-L2, normal.    L2-3, normal.    L3-4, there is minimal disc bulge only facet hypertrophy and ligamentum flavum hypertrophy.  There is superimposed central disc protrusion.  The spinal canal and the neural foraminal are within normal limits.    L4-5, there is minimal disc bulge along with facet hypertrophy and ligamentum flavum hypertrophy.  There is superimposed central disc protrusion. The spinal canal is within normal limits.  The neural foraminal are unremarkable.    L5-S1, there is diffuse disc bulge along with facet hypertrophy and ligamentum flavum hypertrophy.  There is central disc protrusion with associated posterior annular fissure.  The spinal canal is within normal limits.  There is mild bilateral neural foraminal narrowing.    The visualized paraspinal soft tissues are unremarkable.      There is no evidence of abnormal enhancement  following intravenous contrast administration.                             MRI Brain Without Contrast (Final result)  Result time 10/23/17 19:54:04    Final result by Rigo Lawler MD (10/23/17 19:54:04)                 Impression:       Unremarkable MRI of the brain.    Right mastoid effusion.              Electronically signed by: RIGO LAWLER MD  Date:     10/23/17  Time:    19:54              Narrative:    Exam: [id number]  10/23/17  18:44:19 [id number] ([facility]) : MRI BRAIN WITHOUT CONTRAST    Technique:    Multiplanar and multisequence MRI of the brain was performed without and with intravenous contrast.    Comparison:    No direct comparison is available.    Findings:    The paranasal sinuses are within normal limits. There is trace right-sided mastoid effusion.  There is stable appearance of mild bilateral proptosis.  The orbits and intraorbital contents are otherwise unremarkable.      The craniocervical junction is within normal limits.  The midline structures are unremarkable.   The intracranial flow voids are within normal limits.  No diffusion-weighted signal abnormality is present.  There are no focal parenchymal signal abnormalities.  There are no extra-axial fluid collections.  There is no evidence of intracranial hemorrhage.  The ventricles and sulci are within normal limits.                                     Medical Decision Making:   History:   Old Medical Records: I decided to obtain old medical records.  Clinical Tests:   Lab Tests: Ordered and Reviewed  Radiological Study: Ordered and Reviewed            Scribe Attestation:   Scribe #1: I performed the above scribed service and the documentation accurately describes the services I performed. I attest to the accuracy of the note.    Attending Attestation:             Attending ED Notes:   Emergent evaluation a 46-year-old female with progressively worsening bilateral lower extremity weakness with paresthesias.  Patient also complains of intermittent  bilateral upper extremity weakness.  Patient is afebrile, nontoxic-appearing with stable vital signs except for elevation in blood pressure.  Patient has a history of hypertension.  No elevation of white blood cell count.  H&H is 10.7 and 32.7.  BUN/creatinine are 44 1.8.  Patient has a history of chronic renal insufficiency.  Albumen 3.1.  No acute findings on MRI of the brain.  There is a right mastoid effusion.  MRI of the lumbar spine reveals minimal disc bulges.  Cauda equina nerve roots are unremarkable.  The patient is extensively counseled on her diagnosis and treatment including all diagnostic, laboratory and physical exam findings.  The patient discharged good condition and directed to follow-up with neurology in the next 24-48 hours.  Patient was able to ambulate on discharge without difficulty.          ED Course      Clinical Impression:     1. Weakness of both lower extremities    2. Paresthesia of both legs    3. Upper extremity weakness    4. Anemia, unspecified type    5. Lumbar radiculopathy    6. Chronic renal insufficiency, unspecified stage    7. Degenerative disc disease, lumbar                                Zach Ryan MD  10/23/17 [id number]

## 2017-10-24 ENCOUNTER — TELEPHONE (OUTPATIENT)
Dept: NEUROLOGY | Facility: CLINIC | Age: 47
End: 2017-10-24

## 2017-10-24 NOTE — TELEPHONE ENCOUNTER
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

## 2017-10-25 ENCOUNTER — OFFICE VISIT (OUTPATIENT)
Dept: PODIATRY | Facility: CLINIC | Age: 47
End: 2017-10-25
Payer: MEDICAID

## 2017-10-25 VITALS
DIASTOLIC BLOOD PRESSURE: 82 MMHG | BODY MASS INDEX: 25.11 KG/M2 | WEIGHT: 160 LBS | SYSTOLIC BLOOD PRESSURE: 138 MMHG | HEART RATE: 91 BPM | HEIGHT: 67 IN

## 2017-10-25 DIAGNOSIS — L84 CORNS AND CALLUS: ICD-10-CM

## 2017-10-25 DIAGNOSIS — B35.1 DERMATOPHYTOSIS OF NAIL: ICD-10-CM

## 2017-10-25 DIAGNOSIS — E11.49 TYPE II DIABETES MELLITUS WITH NEUROLOGICAL MANIFESTATIONS: Primary | ICD-10-CM

## 2017-10-25 PROCEDURE — 99212 OFFICE O/P EST SF 10 MIN: CPT | Mod: PBBFAC | Performed by: PODIATRIST

## 2017-10-25 PROCEDURE — 11055 PARING/CUTG B9 HYPRKER LES 1: CPT | Mod: Q7,59,PBBFAC | Performed by: PODIATRIST

## 2017-10-25 PROCEDURE — 99999 PR PBB SHADOW E&M-EST. PATIENT-LVL II: CPT | Mod: PBBFAC,,, | Performed by: PODIATRIST

## 2017-10-25 PROCEDURE — 11721 DEBRIDE NAIL 6 OR MORE: CPT | Mod: 59,S$PBB,, | Performed by: PODIATRIST

## 2017-10-25 PROCEDURE — 99213 OFFICE O/P EST LOW 20 MIN: CPT | Mod: 25,S$PBB,, | Performed by: PODIATRIST

## 2017-10-25 RX ORDER — ASPIRIN 81 MG/1
TABLET ORAL
COMMUNITY
Start: 2017-10-06

## 2017-10-25 RX ORDER — CITALOPRAM 20 MG/1
TABLET, FILM COATED ORAL
COMMUNITY
Start: 2017-07-21

## 2017-10-25 RX ORDER — AMLODIPINE BESYLATE 10 MG/1
TABLET ORAL
COMMUNITY
Start: 2017-07-21

## 2017-10-25 NOTE — PROGRESS NOTES
"   Chief Complaint   Patient presents with    PCP     11/2  month Daughter of Maren             HPI:    Patient is a 46 y.o. female here for diabetes foot exam and care.  history of numbness and weakness.  Can't feel her feet.    She has history of right 4th toe amputation.  Hasn't seen a foot doctor since her last visit with me about two years ago.       PCP:  Dr. Hahn  Last visit:  Sept 1, 2017.       Past Medical History:   Diagnosis Date    Diabetes mellitus     Hypertension          Current Outpatient Prescriptions on File Prior to Visit   Medication Sig Dispense Refill    amlodipine (NORVASC) 5 MG tablet Take 1 tablet (5 mg total) by mouth once daily. 30 tablet 1    blood sugar diagnostic Strp 1 strip by Misc.(Non-Drug; Combo Route) route before meals as needed. 120 strip 0    calcitRIOL (ROCALTROL) 0.25 MCG Cap Take 1 capsule (0.25 mcg total) by mouth every other day. 30 capsule 1    chlorthalidone (HYGROTEN) 25 MG Tab Take 25 mg by mouth once daily.      docusate sodium (COLACE) 100 MG capsule Take 1 capsule (100 mg total) by mouth 2 (two) times daily. 60 capsule 0    ergocalciferol (ERGOCALCIFEROL) 50,000 unit Cap Take 1 capsule (50,000 Units total) by mouth every 7 days. 4 capsule 1    ferrous sulfate 325 (65 FE) MG EC tablet Take 1 tablet (325 mg total) by mouth 2 (two) times daily.  0    hydrocodone-acetaminophen 5-325mg (NORCO) 5-325 mg per tablet Take 1 tablet by mouth every 12 (twelve) hours as needed for Pain. 12 tablet 0    insulin aspart protamine-insulin aspart (NOVOLOG 70/30) 100 unit/mL (70-30) InPn pen 15 units SC each morning and 10 units SC each evening 5 Syringe 1    lancets (ONETOUCH ULTRASOFT LANCETS) Misc 1 lancet by Misc.(Non-Drug; Combo Route) route 4 (four) times daily. 120 each 11    pen needle, diabetic (BD ULTRA-FINE DANII PEN NEEDLES) 32 gauge x 5/32" Ndle Inject 1 application into the skin 4 (four) times daily. 120 each 11    sodium bicarbonate 650 MG tablet " Take 1 tablet (650 mg total) by mouth 2 (two) times daily. 60 tablet 11    [DISCONTINUED] amoxicillin-clavulanate 875-125mg (AUGMENTIN) 875-125 mg per tablet Take 1 tablet by mouth 2 (two) times daily. 14 tablet 0     No current facility-administered medications on file prior to visit.          Review of patient's allergies indicates:  No Known Allergies      Social History     Social History    Marital status:      Spouse name: N/A    Number of children: N/A    Years of education: N/A     Occupational History    Not on file.     Social History Main Topics    Smoking status: Current Every Day Smoker     Packs/day: 1.00     Types: Cigarettes    Smokeless tobacco: Never Used    Alcohol use No    Drug use: No    Sexual activity: Not on file     Other Topics Concern    Not on file     Social History Narrative    No narrative on file           Hemoglobin A1C   Date Value Ref Range Status   11/26/2016 8.1 (H) 4.5 - 6.2 % Final     Comment:     According to ADA guidelines, hemoglobin A1C <7.0% represents  optimal control in non-pregnant diabetic patients.  Different  metrics may apply to specific populations.   Standards of Medical Care in Diabetes - 2016.  For the purpose of screening for the presence of diabetes:  <5.7%     Consistent with the absence of diabetes  5.7-6.4%  Consistent with increasing risk for diabetes   (prediabetes)  >or=6.5%  Consistent with diabetes  Currently no consensus exists for use of hemoglobin A1C  for diagnosis of diabetes for children.     09/13/2014 10.9 (H) 4.5 - 6.2 % Final   07/29/2010 11.8 (H) 4.0 - 6.2 % Final           Past Medical History:   Diagnosis Date    Diabetes mellitus     Hypertension        Current Outpatient Prescriptions on File Prior to Visit   Medication Sig Dispense Refill    amlodipine (NORVASC) 5 MG tablet Take 1 tablet (5 mg total) by mouth once daily. 30 tablet 1    blood sugar diagnostic Strp 1 strip by Misc.(Non-Drug; Combo Route) route  "before meals as needed. 120 strip 0    calcitRIOL (ROCALTROL) 0.25 MCG Cap Take 1 capsule (0.25 mcg total) by mouth every other day. 30 capsule 1    chlorthalidone (HYGROTEN) 25 MG Tab Take 25 mg by mouth once daily.      docusate sodium (COLACE) 100 MG capsule Take 1 capsule (100 mg total) by mouth 2 (two) times daily. 60 capsule 0    ergocalciferol (ERGOCALCIFEROL) 50,000 unit Cap Take 1 capsule (50,000 Units total) by mouth every 7 days. 4 capsule 1    ferrous sulfate 325 (65 FE) MG EC tablet Take 1 tablet (325 mg total) by mouth 2 (two) times daily.  0    hydrocodone-acetaminophen 5-325mg (NORCO) 5-325 mg per tablet Take 1 tablet by mouth every 12 (twelve) hours as needed for Pain. 12 tablet 0    insulin aspart protamine-insulin aspart (NOVOLOG 70/30) 100 unit/mL (70-30) InPn pen 15 units SC each morning and 10 units SC each evening 5 Syringe 1    lancets (ONETOUCH ULTRASOFT LANCETS) Misc 1 lancet by Misc.(Non-Drug; Combo Route) route 4 (four) times daily. 120 each 11    pen needle, diabetic (BD ULTRA-FINE DANII PEN NEEDLES) 32 gauge x 5/32" Ndle Inject 1 application into the skin 4 (four) times daily. 120 each 11    sodium bicarbonate 650 MG tablet Take 1 tablet (650 mg total) by mouth 2 (two) times daily. 60 tablet 11    [DISCONTINUED] amoxicillin-clavulanate 875-125mg (AUGMENTIN) 875-125 mg per tablet Take 1 tablet by mouth 2 (two) times daily. 14 tablet 0     No current facility-administered medications on file prior to visit.        No Known Allergies    Social History     Social History    Marital status:      Spouse name: N/A    Number of children: N/A    Years of education: N/A     Occupational History    Not on file.     Social History Main Topics    Smoking status: Current Every Day Smoker     Packs/day: 1.00     Types: Cigarettes    Smokeless tobacco: Never Used    Alcohol use No    Drug use: No    Sexual activity: Not on file     Other Topics Concern    Not on file " "    Social History Narrative    No narrative on file         Review of Systems  Constitutional: no fever or chills, pain well controlled   Eyes: no visual changes   ENT: no nasal congestion or sore throat   Musculoskeletal: no arthralgias or myalgias   Neurological: positive for history of numbness of feet      OBJECTIVE:     Vitals:    10/25/17 1039   BP: 138/82   Pulse: 91   Weight: 72.6 kg (160 lb)   Height: 5' 7" (1.702 m)          Dermatologic:  Toenails x 9 thickened and dystrophic with subungual debris .  Calluses sub 1st metatarsal head right foot.  No open wounds.  + debris.          Vascular: 1+ palpable Rt. dorsalis pedis and posterior tibial pulses. Capillary fill time <3 seconds to all toes. Diminished pedal hair growth.      Neurologic: Light touch absent to the Rt. Foot. Protective sensation absent to the Rt. Foot per [facility] monofilament.     Musculoskeletal: Adequate alignment and ROM of the Rt. Foot and ankle. No pain with palpation of the Rt. 4th toe.         ASSESSMENT/PLAN:     Assessment:  I counseled the patient on the patient's conditions, their implications and medical management.        Type II diabetes mellitus with neurological manifestations  -     DIABETIC SHOES FOR HOME USE    Toe amputation status, right  -     DIABETIC SHOES FOR HOME USE    Dermatophytosis of nail    Corns and callus    Other orders  -     Foot Care      Foot care education.    Routine Foot Care  Date/Time: 10/25/2017 11:14 AM  Performed by: ALBA SALDANA  Authorized by: ALBA SALDANA     Consent Done?:  Yes (Verbal)  Hyperkeratotic Skin Lesions?: Yes    Number of trimmed lesions:  1  Location(s):  Right 1st Toe    Nail Care Type:  Debride(Left 1st Toe, Left 3rd Toe, Left 2nd Toe, Left 4th Toe, Left 5th Toe, Right 1st Toe, Right 2nd Toe, Right 3rd Toe and Right 5th Toe)  Patient tolerance:  Patient tolerated the procedure well with no immediate complications     With patient's permission, the toenails " mentioned above were aggressively reduced and debrided using a nail nipper, removing all offending nail and debris. Utilizing a #15 scalpel, I trimmed the corns and calluses at the above mentioned location.      The patient will continue to monitor the areas daily, inspect the feet, wear protective shoe gear when ambulatory, and moisturizer to maintain skin integrity.

## 2017-11-22 ENCOUNTER — OFFICE VISIT (OUTPATIENT)
Dept: NEUROLOGY | Facility: CLINIC | Age: 47
End: 2017-11-22
Payer: MEDICAID

## 2017-11-22 VITALS
HEART RATE: 104 BPM | DIASTOLIC BLOOD PRESSURE: 110 MMHG | BODY MASS INDEX: 24.25 KG/M2 | SYSTOLIC BLOOD PRESSURE: 208 MMHG | HEIGHT: 68 IN | WEIGHT: 160 LBS

## 2017-11-22 DIAGNOSIS — R53.1 WEAKNESS GENERALIZED: Primary | ICD-10-CM

## 2017-11-22 PROCEDURE — 99214 OFFICE O/P EST MOD 30 MIN: CPT | Mod: PBBFAC | Performed by: STUDENT IN AN ORGANIZED HEALTH CARE EDUCATION/TRAINING PROGRAM

## 2017-11-22 PROCEDURE — 99205 OFFICE O/P NEW HI 60 MIN: CPT | Mod: S$PBB,,, | Performed by: STUDENT IN AN ORGANIZED HEALTH CARE EDUCATION/TRAINING PROGRAM

## 2017-11-22 PROCEDURE — 99999 PR PBB SHADOW E&M-EST. PATIENT-LVL IV: CPT | Mod: PBBFAC,,, | Performed by: STUDENT IN AN ORGANIZED HEALTH CARE EDUCATION/TRAINING PROGRAM

## 2017-11-22 NOTE — PROGRESS NOTES
Neurology Clinic  Initial Consult    Patient Name: Barbara Najera  MRN: [id number]    CC: Weakness, falls    HPI: Barbara Najera is a 46 y.o. female w/ PMH significant for DM2, HTN, CKD4, neuropathy, presenting as self-referral for evaluation of weakness and falls.    She reports that she had her first fall in 7/2016 in [facility].  At that time, she and her  attributed her fall to tripping on plastic shipping material covering the floor.  Of note, that appear to attribute her current symptoms to the initial fall.  Since that time, she reports that she has had progressively worsening weakness encompassing b/l arms and legs, most noticeably since 2/2017.  She believes that her R side is more severely affected.  She reports that her arms became affected within the last 4-6 months.  She reports she has up to 2 falls per week if she is not careful.  She denies any significant numbness (reports a baseline neuropathy 2/2 DM), and 2 months of urinary and fecal incontinence (urinary because she has trouble making it to the restroom in time 2/2 weakness).      Her family history is notable for ALS and MS.  MS was diagnosed in pt's maternal grandmother (unknown age).  Her maternal uncle was diagnosed with ALS in his late 50s, while her maternal cousin was diagnosed with ALS in his 40s.  She reports no prior history of stroke/MI/DVT/PE/clot, nor arrhythmia.  She denies any history of malignancy.  She denies any significant GI/upper respiratory illnesses associated with onset of this weakness.      She is a current smoker, 1 ppd since 18 yo.  Denies alcohol or recreational drug use.  Not working currently 2/2 weakness.  Reports that although she has a wheelchair, she primarily uses a walker to ambulate at home.    Review of Systems:  Constitutional: no fever or chills  Eyes: no visual changes  ENT: no nasal congestion or sore throat  Respiratory: no cough or shortness of breath  Cardiovascular: no chest pain or  palpitations  Gastrointestinal: no nausea or vomiting, no abdominal pain or change in bowel habits  Genitourinary: no hematuria or dysuria  Integument/Breast: no rash or pruritis  Musculoskeletal: no arthralgias or myalgias  Neurological: positive for weakness  Behavioral/Psych: no auditory or visual hallucinations    Past Medical History  Past Medical History:   Diagnosis Date    Diabetes mellitus     Hypertension        Medications    Current Outpatient Prescriptions:     amLODIPine (NORVASC) 10 MG tablet, , Disp: , Rfl:     amlodipine (NORVASC) 5 MG tablet, Take 1 tablet (5 mg total) by mouth once daily., Disp: 30 tablet, Rfl: 1    aspirin (ECOTRIN) 81 MG EC tablet, , Disp: , Rfl:     blood sugar diagnostic Strp, 1 strip by Misc.(Non-Drug; Combo Route) route before meals as needed., Disp: 120 strip, Rfl: 0    calcitRIOL (ROCALTROL) 0.25 MCG Cap, Take 1 capsule (0.25 mcg total) by mouth every other day., Disp: 30 capsule, Rfl: 1    chlorthalidone (HYGROTEN) 25 MG Tab, Take 25 mg by mouth once daily., Disp: , Rfl:     citalopram (CELEXA) 20 MG tablet, , Disp: , Rfl:     docusate sodium (COLACE) 100 MG capsule, Take 1 capsule (100 mg total) by mouth 2 (two) times daily., Disp: 60 capsule, Rfl: 0    ergocalciferol (ERGOCALCIFEROL) 50,000 unit Cap, Take 1 capsule (50,000 Units total) by mouth every 7 days., Disp: 4 capsule, Rfl: 1    ferrous sulfate 325 (65 FE) MG EC tablet, Take 1 tablet (325 mg total) by mouth 2 (two) times daily., Disp: , Rfl: 0    hydrocodone-acetaminophen 5-325mg (NORCO) 5-325 mg per tablet, Take 1 tablet by mouth every 12 (twelve) hours as needed for Pain., Disp: 12 tablet, Rfl: 0    insulin aspart protamine-insulin aspart (NOVOLOG 70/30) 100 unit/mL (70-30) InPn pen, 15 units SC each morning and 10 units SC each evening, Disp: 5 Syringe, Rfl: 1    lancets (ONETOUCH ULTRASOFT LANCETS) Misc, 1 lancet by Misc.(Non-Drug; Combo Route) route 4 (four) times daily., Disp: 120 each, Rfl:  "11    pen needle, diabetic (BD ULTRA-FINE DANII PEN NEEDLES) 32 gauge x 5/32" Ndle, Inject 1 application into the skin 4 (four) times daily., Disp: 120 each, Rfl: 11    sodium bicarbonate 650 MG tablet, Take 1 tablet (650 mg total) by mouth 2 (two) times daily., Disp: 60 tablet, Rfl: 11  Any other notable medications as documented in HPI    Allergies  Review of patient's allergies indicates:  No Known Allergies    Social History  Social History     Social History    Marital status:      Spouse name: N/A    Number of children: N/A    Years of education: N/A     Occupational History    Not on file.     Social History Main Topics    Smoking status: Current Every Day Smoker     Packs/day: 1.00     Types: Cigarettes    Smokeless tobacco: Never Used    Alcohol use No    Drug use: No    Sexual activity: Not on file     Other Topics Concern    Not on file     Social History Narrative    No narrative on file     Any other notable Social History as documented in HPI.    Family History  Family History   Problem Relation Age of Onset    Hyperlipidemia Mother     Diabetes Mother     Cancer Father    MS was diagnosed in pt's maternal grandmother (unknown age).  Her maternal uncle was diagnosed with ALS in his late 50s, while her maternal cousin was diagnosed with ALS in his 40s.  Any other notable FMH as documented in HPI.    Physical Exam  BP (!) 208/110   Pulse 104   Ht 5' 8" (1.727 m)   Wt 72.6 kg (160 lb)   LMP 10/22/2017   BMI 24.33 kg/m²     General: Well-developed, well-groomed. No apparent distress  HENT: Normocephalic, atraumatic.  Pinna, tragus, and external canal non-tender.    Cardiovascular: Regular rate and rhythm with no murmurs, rubs or gallops.  No carotid bruits auscultated  Chest: Lungs clear to auscultation bilaterally.  No wheezes, stridor, ronchi appreciated.  Abdomen: Normoactive bowel sounds present.  Soft, nontender to palpation.  Musculoskeletal: No peripheral " edema    Neurologic Exam: The patient is awake, alert and oriented. Language is fluent.  Fund of knowledge is appropriate.     Cranial nerves:   Pupils are round and reactive to light and accommodation. Fundoscopic exam reveals normal vessels without hemorrhage, optic disc is sharp.  Visual fields are full to confrontation.  No red desaturation.  Ocular motility is full in all cardinal positions of gaze.   Facial sensation is normal to light touch.   Facial activation is symmetric.   Hearing is symmetric bilaterally.   Palate elevates symmetrically.   Shoulder elevation is symmetric and full strength bilaterally.   Tongue is midline and neck rotation strength is normal bilaterally.   Single breath count - 20; but poor effort.  No diplopia, ptosis with sustained upward gaze.     Motor examination   Decreased muscle bulk throughout BUE and BLE.  Normal tone throughout BUE and BLE  BUE 3/5 strength proximally (shoulder abduction,elbow flexion/extension).  4/5 strength distally (wrist and finger flexion/extension)  BLE 2/5 strength proximally (hip and knee flexion/extension).  4/5 strength distally (ankle, toe flexion/extension).    Sensory examination     Sensation to light touch: intact in BUE and BLE  Sensation to temperature: decreased peripherally in BUE, improves proximally.  Intact in BLE.  Sensation to vibration: intact in BUE and BLE  Sensation to pinprick: intact in BUE and BLE  Proprioception: intact in BUE and BLE    Deep tendon reflexes   Negative Mauro's  Equivocal toe movements with plantar stimulation  1+ b/l biceps, brachioradialis, triceps.  0 b/l Achilles and patellar    Gait: Unable to assess 2/2 weakness, pt unwilling to get out of wheelchair    Coordination: Finger to nose normal for strength b/l.      Lab and Test Results    Recent Results (from the past 24 hour(s))   TSH    Collection Time: 11/22/17  4:30 PM   Result Value Ref Range    TSH 0.810 0.400 - 4.000 uIU/mL   HEMOGLOBIN A1C     Collection Time: 11/22/17  4:30 PM   Result Value Ref Range    Hemoglobin A1C 5.6 4.0 - 5.6 %    Estimated Avg Glucose 114 68 - 131 mg/dL   Comprehensive metabolic panel    Collection Time: 11/22/17  4:30 PM   Result Value Ref Range    Sodium 141 136 - 145 mmol/L    Potassium 4.1 3.5 - 5.1 mmol/L    Chloride 111 (H) 95 - 110 mmol/L    CO2 21 (L) 23 - 29 mmol/L    Glucose 132 (H) 70 - 110 mg/dL    BUN, Bld 38 (H) 6 - 20 mg/dL    Creatinine 2.0 (H) 0.5 - 1.4 mg/dL    Calcium 10.2 8.7 - 10.5 mg/dL    Total Protein 8.0 6.0 - 8.4 g/dL    Albumin 3.2 (L) 3.5 - 5.2 g/dL    Total Bilirubin 0.3 0.1 - 1.0 mg/dL    Alkaline Phosphatase 98 55 - 135 U/L    AST 10 10 - 40 U/L    ALT 8 (L) 10 - 44 U/L    Anion Gap 9 8 - 16 mmol/L    eGFR if African American 34.0 (A) >60 mL/min/1.73 m^2    eGFR if non African American 29.5 (A) >60 mL/min/1.73 m^2   CBC auto differential    Collection Time: 11/22/17  4:30 PM   Result Value Ref Range    WBC 7.03 3.90 - 12.70 K/uL    RBC 3.90 (L) 4.00 - 5.40 M/uL    Hemoglobin 10.6 (L) 12.0 - 16.0 g/dL    Hematocrit 33.9 (L) 37.0 - 48.5 %    MCV 87 82 - 98 fL    MCH 27.2 27.0 - 31.0 pg    MCHC 31.3 (L) 32.0 - 36.0 g/dL    RDW 12.8 11.5 - 14.5 %    Platelets 362 (H) 150 - 350 K/uL    MPV 11.0 9.2 - 12.9 fL    Immature Granulocytes 0.3 0.0 - 0.5 %    Gran # 4.4 1.8 - 7.7 K/uL    Immature Grans (Abs) 0.02 0.00 - 0.04 K/uL    Lymph # 2.0 1.0 - 4.8 K/uL    Mono # 0.3 0.3 - 1.0 K/uL    Eos # 0.2 0.0 - 0.5 K/uL    Baso # 0.09 0.00 - 0.20 K/uL    nRBC 0 0 /100 WBC    Gran% 63.0 38.0 - 73.0 %    Lymph% 28.3 18.0 - 48.0 %    Mono% 4.8 4.0 - 15.0 %    Eosinophil% 2.3 0.0 - 8.0 %    Basophil% 1.3 0.0 - 1.9 %    Differential Method Automated    Sedimentation rate, manual    Collection Time: 11/22/17  4:30 PM   Result Value Ref Range    Sed Rate 67 (H) 0 - 20 mm/Hr   C-REACTIVE PROTEIN    Collection Time: 11/22/17  4:30 PM   Result Value Ref Range    CRP 8.8 (H) 0.0 - 8.2 mg/L         Images:  10/23/17 MRI Brain  w/o contrast: Per independent resident review and interpretation,  No significant T2 flair abnormalities.  No hemorrhage, acute infarct, mass effect.    10/23/17 MRI L spine w/ and w/o contrast: Per independent resident review and interpretation,  L5-S1 disc protrusion, without canal impingement    Independently reviewed yes    Assessment and Plan    Problem List Items Addressed This Visit        Other    Weakness generalized - Primary    Current Assessment & Plan     Assessment  Barbara Najera is a 46 y.o. female w/ PMH significant for DM2, HTN, CKD4, neuropathy, presenting as self-referral for evaluation of weakness and falls.    This presentation is overall concerning for a lower motor neuron abnormality on exam, given the paucity of upper motor neuron signs (some of which, e.g. Hyperreflexia, could potentially be masked by a history of poorly controlled diabetes, but at this point suspect less likely).  Her FMH is concerning for ALS and MS.  Her MRI is not suggestive of MS.  Her exam lacks upper motor neuron findings to support a diagnosis of ALS at this time.  Other differential diagnoses include malignancy/paraneoplastic syndrome, infectious (lyme, WNV), or CIDP.    PMH of DM as above, is currently stable.  Her history of HTN is unstable on exam today, although she states her BP normally runs lower and she is currently asymptomatic.      Recommendations & Plan:  -HIV, TSH, A1c, CMP, CBC, WNV, ESR, CRP, LEOPOLDO  -Lumbar puncture: Cell count+diff, Glucose, protein, WNV, lyme, paraneoplastic, ACE  -EMG and Nerve Conduction Study  -Return to clinic in 2 months after above is completed.         Relevant Orders    HIV 1 / 2 ANTIBODY    TSH (Completed)    HEMOGLOBIN A1C (Completed)    Comprehensive metabolic panel (Completed)    CBC auto differential (Completed)    LYME DISEASE ANTIBODY BY EIA    WEST NILE ANTIBODIES, IGG AND IGM    Sedimentation rate, manual (Completed)    C-REACTIVE PROTEIN (Completed)    LEOPOLDO    EMG w/  Ultrasound and Nerve Conduction    FL Lumbar Puncture (xpd)    CSF cell count with differential    Protein, CSF    Glucose, CSF    CSF culture    ACE, CSF    West Nile Virus by PCR, CSF    WNV Antibodies, CSF    Paraneoplastic Autoantibody Eval, CSF    Lyme Disease Ab, Western Blot, CSF    VDRL, CSF    Freeze and Hold,             Marcus Sotelo MD  Neurology Resident   [facility]  [address] [place] [address]

## 2017-11-22 NOTE — PATIENT INSTRUCTIONS
-Blood work  -Lumbar puncture  -EMG and Nerve Conduction Study  -Return to clinic in 2 months after above is completed.

## 2017-11-23 NOTE — ASSESSMENT & PLAN NOTE
Assessment  Barbara Najera is a 46 y.o. female w/ PMH significant for DM2, HTN, CKD4, neuropathy, presenting as self-referral for evaluation of weakness and falls.    This presentation is overall concerning for a lower motor neuron abnormality on exam, given the paucity of upper motor neuron signs (some of which, e.g. Hyperreflexia, could potentially be masked by a history of poorly controlled diabetes, but at this point suspect less likely).  Her FMH is concerning for ALS and MS.  Her MRI is not suggestive of MS.  Her exam lacks upper motor neuron findings to support a diagnosis of ALS at this time.  Other differential diagnoses include malignancy/paraneoplastic syndrome, infectious (lyme, WNV), or CIDP.    PMH of DM as above, is currently stable.  Her history of HTN is unstable on exam today, although she states her BP normally runs lower and she is currently asymptomatic.      Recommendations & Plan:  -HIV, TSH, A1c, CMP, CBC, WNV, ESR, CRP, LEOPOLDO  -Lumbar puncture: Cell count+diff, Glucose, protein, WNV, lyme, paraneoplastic, ACE  -EMG and Nerve Conduction Study  -Return to clinic in 2 months after above is completed.

## 2017-11-26 NOTE — PROGRESS NOTES
I have seen the patient, reviewed the Resident's history and physical, assessment and plan. I have personally interviewed and examined the patient at bedside and agree with the findings.     Will plan to evaluate for motor neuron versus peripheral demyelinating disease versus myopathy, also potentially diabetic amyotrophy,  but exam is with variable strength and without significant atrophy raising concern for functional etiology.    MD Andrey Caro - Neurology

## 2017-11-28 ENCOUNTER — TELEPHONE (OUTPATIENT)
Dept: NEUROLOGY | Facility: CLINIC | Age: 47
End: 2017-11-28

## 2017-11-28 NOTE — TELEPHONE ENCOUNTER
I spoke with Ms. Najera to inform her of elevated inflammatory markers and + WNV IgG.  Recommended that she continue to pursue EMG/NCS as outpt (difficult to do as inpt) and will ask MA to help get LP scheduled.    She reported that her symptoms remain stable since our last visit.    Marcus Sotelo MD      ----- Message from Santiago Delgado sent at 11/28/2017 12:48 PM CST -----  Contact: Patient @ [phone]  Patient is requesting to be admitted into to hospital to complete the remaining of the test, pls call to update